# Patient Record
Sex: FEMALE | Race: WHITE | URBAN - NONMETROPOLITAN AREA
[De-identification: names, ages, dates, MRNs, and addresses within clinical notes are randomized per-mention and may not be internally consistent; named-entity substitution may affect disease eponyms.]

---
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
## 2025-01-07 ENCOUNTER — RX ONLY (RX ONLY)
Age: 20
End: 2025-01-07

## 2025-01-07 RX ORDER — DUPILUMAB 300 MG/2ML
SQ INJECTION, SOLUTION SUBCUTANEOUS
Qty: 6 | Refills: 11 | Status: ERX

## 2025-01-14 ENCOUNTER — RX ONLY (RX ONLY)
Age: 20
End: 2025-01-14

## 2025-01-14 RX ORDER — DUPILUMAB 300 MG/2ML
SQ INJECTION, SOLUTION SUBCUTANEOUS
Qty: 6 | Refills: 11 | Status: ERX

## 2025-04-09 ENCOUNTER — APPOINTMENT (OUTPATIENT)
Dept: URBAN - NONMETROPOLITAN AREA CLINIC 3 | Facility: CLINIC | Age: 20
Setting detail: DERMATOLOGY
End: 2025-04-09

## 2025-04-09 DIAGNOSIS — L20.89 OTHER ATOPIC DERMATITIS: ICD-10-CM | Status: INADEQUATELY CONTROLLED

## 2025-04-09 PROCEDURE — ? EBGLYSS INITIATION

## 2025-04-09 PROCEDURE — ? PRESCRIPTION

## 2025-04-09 PROCEDURE — ? PATIENT SPECIFIC COUNSELING

## 2025-04-09 PROCEDURE — 99214 OFFICE O/P EST MOD 30 MIN: CPT

## 2025-04-09 PROCEDURE — ? DUPIXENT MONITORING

## 2025-04-09 PROCEDURE — ? PRESCRIPTION SAMPLES PROVIDED

## 2025-04-09 PROCEDURE — ? COUNSELING

## 2025-04-09 RX ORDER — LEBRIKIZUMAB-LBKZ 250 MG/2ML
INJECTION, SOLUTION SUBCUTANEOUS
Qty: 2 | Refills: 4 | Status: ERX | COMMUNITY
Start: 2025-04-09

## 2025-04-09 RX ORDER — LEBRIKIZUMAB-LBKZ 250 MG/2ML
INJECTION, SOLUTION SUBCUTANEOUS
Qty: 4 | Refills: 0 | Status: ERX

## 2025-04-09 RX ADMIN — TACROLIMUS: 1 OINTMENT TOPICAL at 00:00

## 2025-04-09 RX ADMIN — LEBRIKIZUMAB-LBKZ: 250 INJECTION, SOLUTION SUBCUTANEOUS at 00:00

## 2025-04-09 ASSESSMENT — LOCATION DETAILED DESCRIPTION DERM
LOCATION DETAILED: RIGHT VENTRAL PROXIMAL FOREARM
LOCATION DETAILED: EPIGASTRIC SKIN
LOCATION DETAILED: POSTERIOR MID-PARIETAL SCALP
LOCATION DETAILED: LEFT ANTECUBITAL SKIN
LOCATION DETAILED: UPPER STERNUM
LOCATION DETAILED: LEFT LATERAL BUCCAL CHEEK
LOCATION DETAILED: MEDIAL FRONTAL SCALP
LOCATION DETAILED: RIGHT INFERIOR LATERAL NECK
LOCATION DETAILED: INFERIOR THORACIC SPINE
LOCATION DETAILED: RIGHT ANTECUBITAL SKIN
LOCATION DETAILED: LEFT INFERIOR LATERAL NECK
LOCATION DETAILED: RIGHT LATERAL BUCCAL CHEEK
LOCATION DETAILED: LEFT VENTRAL PROXIMAL FOREARM

## 2025-04-09 ASSESSMENT — LOCATION ZONE DERM
LOCATION ZONE: TRUNK
LOCATION ZONE: SCALP
LOCATION ZONE: FACE
LOCATION ZONE: NECK
LOCATION ZONE: ARM

## 2025-04-09 ASSESSMENT — LOCATION SIMPLE DESCRIPTION DERM
LOCATION SIMPLE: LEFT UPPER ARM
LOCATION SIMPLE: CHEST
LOCATION SIMPLE: RIGHT UPPER ARM
LOCATION SIMPLE: LEFT ANTERIOR NECK
LOCATION SIMPLE: UPPER BACK
LOCATION SIMPLE: POSTERIOR SCALP
LOCATION SIMPLE: ABDOMEN
LOCATION SIMPLE: RIGHT CHEEK
LOCATION SIMPLE: FRONTAL SCALP
LOCATION SIMPLE: RIGHT ANTERIOR NECK
LOCATION SIMPLE: RIGHT FOREARM
LOCATION SIMPLE: LEFT FOREARM
LOCATION SIMPLE: LEFT CHEEK

## 2025-04-09 ASSESSMENT — BSA ECZEMA: % BODY COVERED IN ECZEMA: 30

## 2025-04-09 ASSESSMENT — SEVERITY ASSESSMENT 2020: SEVERITY 2020: MODERATE

## 2025-04-09 ASSESSMENT — ITCH NUMERIC RATING SCALE: HOW SEVERE IS YOUR ITCHING?: 8

## 2025-04-09 NOTE — PROCEDURE: COUNSELING
Detail Level: Detailed
Patient Specific Counseling (Will Not Stick From Patient To Patient): -Patient has been on Dupixent for over 18 months. She is tolerating Dupixent well, but has never experienced clear skin. She still experiences frequent flares of atopic dermatitis that are itchy and painful, which is significantly impacting her quality of life. We discussed switching from Dupixent to Ebglyss, in an effort to gain further control of her AD. Patient elects to proceed.\\n-We will submit for Ebglyss initiation with her insurance. In the meantime, continue Dupixent 300mg every other week as prescribed. I also prescribed tacrolimus 0.1% ointment; AAA of face, trunk, and extremities BID PRN\\n-F/U 3 months after beginning Ebglyss. Patient voices understanding and agrees to plan. All questions and concerns addressed.

## 2025-04-09 NOTE — PROCEDURE: MIPS QUALITY
Quality 226: Preventive Care And Screening: Tobacco Use: Screening And Cessation Intervention: Patient screened for tobacco use and is an ex/non-smoker
Quality 486: Dermatitis - Improvement In Patient-Reported Itch Severity: Itch severity assessment score was not reduced by at least 3 points from the initial (index) score to the follow-up visit score or assessment was not completed during the follow-up encounter
Quality 130: Documentation Of Current Medications In The Medical Record: Current Medications Documented
Detail Level: Detailed
Quality 47: Advance Care Plan: Advance Care Planning discussed and documented in the medical record; patient did not wish or was not able to name a surrogate decision maker or provide an advance care plan.

## 2025-04-09 NOTE — PROCEDURE: EBGLYSS INITIATION
Is Soriatane Contraindicated?: No
Diagnosis (Required): Atopic Dermatitis/Eczematous Dermatitis
Detail Level: Zone
Pregnancy And Lactation Warning Text: There is limited data about fetal risk in women taking Ebglyss while pregnant. It is unknown if this medication is excreted in breast milk.
Add Pregnancy And Lactation Warning To Medication Counseling?: Yes
Ebglyss Monitoring Guidelines: There is no laboratory monitoring requirement with Ebglyss.
Ebglyss Dosing: 500 mg SC week 0 and week 2 then 250 mg SC every other week for weeks 4-16 or later, when adequate clinical response is achieved, then 250mg every 4 weeks

## 2025-04-09 NOTE — PROCEDURE: PATIENT SPECIFIC COUNSELING
Detail Level: Detailed
-Patient has had significant improvement since starting dupixent but has never been clear still remains flared on the neck and arms. We discussed at length other options for treatment, including but not limited to phototherapy and other biologics. \\n- patient current BSA is 30% and rates her itch as 8/10 for this reason we will initiate patient on ebglyss. \\n- patient was given sample of dupixent today until we can get her approved for ebglyss.\\n-F/U in 3 months after starting ebglyss for further evaluation and management. All questions and concerns addressed. Patient voices understanding and agrees to plan.

## 2025-04-10 RX ORDER — TACROLIMUS 1 MG/G
OINTMENT TOPICAL
Qty: 100 | Refills: 2 | Status: ERX | COMMUNITY
Start: 2025-04-09

## 2025-04-10 RX ORDER — TACROLIMUS 1 MG/G
OINTMENT TOPICAL
Qty: 100 | Refills: 2 | Status: ERX

## 2025-04-17 RX ORDER — LEBRIKIZUMAB-LBKZ 250 MG/2ML
INJECTION, SOLUTION SUBCUTANEOUS
Qty: 4 | Refills: 0 | Status: ERX

## 2025-04-17 RX ORDER — LEBRIKIZUMAB-LBKZ 250 MG/2ML
INJECTION, SOLUTION SUBCUTANEOUS
Qty: 2 | Refills: 4 | Status: ERX

## 2025-05-01 RX ORDER — RUXOLITINIB 15 MG/G
AAA CREAM TOPICAL BID
Qty: 60 | Refills: 2 | Status: ERX